# Patient Record
Sex: MALE | Race: WHITE | NOT HISPANIC OR LATINO | URBAN - METROPOLITAN AREA
[De-identification: names, ages, dates, MRNs, and addresses within clinical notes are randomized per-mention and may not be internally consistent; named-entity substitution may affect disease eponyms.]

---

## 2020-03-25 NOTE — PATIENT DISCUSSION
CORNEAL ABRASION; OS; INSERTION OF BANDAGE CONTACT LENS, IN OFFICE FOR PATIENT'S DISCOMFORT. CONTINUE TO MONITOR. RETURN FOR FOLLOW-UP AS SCHEDULED.

## 2020-03-25 NOTE — PATIENT DISCUSSION
New Prescription: erythromycin (erythromycin): ointment: 5 mg/gram (0.5 %) a small amount at bedtime as directed into affected eye 03-

## 2020-03-26 NOTE — PATIENT DISCUSSION
CORNEAL ABRASION; OS, RESOLVING; PRESCRIBE BESI/OCUFLOX QID OS AND EMYCIN JOSE QHS. CONTINUE TO MONITOR. RETURN FOR FOLLOW-UP AS SCHEDULED.

## 2020-04-01 NOTE — PATIENT DISCUSSION
Continue: erythromycin (erythromycin): ointment: 5 mg/gram (0.5 %) a small amount at bedtime as directed into affected eye 03-

## 2020-06-03 NOTE — PATIENT DISCUSSION
CATARACTS, OU - VISUALLY SIGNIFICANT. SCHEDULE _OD_ FIRST THEN LATER IN _OS_ DISCUSSED OPTION OF ____STANDARD OU__. PATIENT UNDERSTANDS AND DESIRES ____STANDARD OU__.

## 2020-06-03 NOTE — PATIENT DISCUSSION
Surgery Counseling: I have discussed the option of scheduling surgery versus following, as well as the risks, benefits and alternatives of cataract surgery with the patient. It was explained that the surgery is medically indicated at this time, and it can be performed at the patient's option as delaying will cause no further deterioration, therefore there is no rush and there is no harm in waiting to have surgery. It was also explained that there is no guarantee that removing the cataract will improve their vision. The patient understands and desires to proceed with cataract surgery with the implantation of an intraocular lens to improve vision for __tv / reading__. I have given the patient the prescribed regimen of the all-in-one drop to use before and after cataract surgery. They have elected to use the all-in-one option of Pred/Gati/Brom(prednisolone acetate,gatifloxacin,and bromfenac. Patient to administer as directed.

## 2020-07-29 NOTE — PATIENT DISCUSSION
Pre-Op 2nd Eye Counseling: The patient has noticed an improvement in their visual symptoms in the operative eye. The patient complains of decreased vision in the fellow eye when _______TV AND READING__. It was explained to the patient that the decision to proceed with cataract surgery in the fellow eye is entirely a separate decision from the surgical eye. All of the same risks, benefits and alternatives are reviewed with the patient again. The patient does feel the vision in the non-operative eye is limiting their daily activities and elects to proceed with cataract surgery in the _LEFT__ eye. . I have given the patient the prescribed regimen of  drops to use before and after cataract surgery. Patient to administer as directed.

## 2020-07-29 NOTE — PATIENT DISCUSSION
S/P PE IOL, _od__. DOING WELL. CONTINUE PRED-GATI-BROM IN THE SURGICAL EYE  FOR A TOTAL OF 3 WEEKS USE THEN DISCONTINUE. PATIENT DESIRES STANDARD_______IOL FOR 2ND EYE. SCHEDULE CATARACT SURGERY.

## 2020-08-17 NOTE — PATIENT DISCUSSION
Deysi Orozco is requesting refill(s) celexa  Last OV 2-26-19 (pertaining to medication)  LR 12-14-18 #30 5 RF (per medication requested)  Next office visit scheduled or attempted Yes 5-28-19   If no, reason: Vertex Distance:

## 2020-10-07 NOTE — PATIENT DISCUSSION
Continue: erythromycin (erythromycin): ointment: 5 mg/gram (0.5 %) a small amount at bedtime as directed into affected eye 03- Physical Therapy  Daily Treatment Note  Date: 10/7/2020  Patient Name: Jayme Diaz  MRN: [id number]     :   1943    Subjective:   General  Referring Practitioner: General Jitendra MD   PT Visit Information  Onset Date: 19  PT Insurance Information: [employer]  Total # of Visits Approved: 12  Total # of Visits to Date: 10  Plan of Care/Certification Expiration Date: 20  No Show: 1  Progress Note Due Date: 10/23/20  Canceled Appointment: 0  Progress Note Counter: 10/12  Subjective  Subjective: \"I feel real unsteady today for some reason. \"  The patient noted he felt like he had to lean harder on his walker to get around. Pain Screening  Patient Currently in Pain: Yes  Pain Assessment  Pain Assessment: 0-10  Pain Level: 3  Patient's Stated Pain Goal: No pain  Pain Type: Acute pain  Pain Location: Knee  Pain Orientation: Left  Pain Descriptors: Aching  Pain Frequency: Intermittent  Clinical Progression: Gradually improving  Functional Pain Assessment: Activities are not prevented  Non-Pharmaceutical Pain Intervention(s): Ambulation/Increased Activity;Cold applied  Response to Pain Intervention: None  Multiple Pain Sites: Yes  Vital Signs  Patient Currently in Pain: Yes  Patient Observation  Observations: poor gait and loss of balance       Treatment Activities:      Bed mobility  Bridging: Independent  Transfers  Sit to Stand: Stand by assistance        Ambulation  Ambulation?: Yes  WB Status: WBAT Left   Ambulation 1  Surface: level tile  Device: Rolling Walker  Assistance: Independent  Quality of Gait: fair  Gait Deviations: Slow Birdie; Increased JOSE; Decreased step length;Deviated path;Decreased arm swing  Distance: 150 ft   Comments: poor control of   Stairs/Curb  Stairs?: Yes  Stairs  # Steps : 3  Stairs Height: 8\"  Rails: Bilateral  Curbs: 2\"  Device: Rolling walker  Assistance: Supervision  Comment: Fall risk      Balance  Standing - Static: Fair  Standing - Dynamic: Poor  Comments: FOLLOW UP: Yes  Treatment Initiated : Neuro re-ed exercises. Discharge Recommendations: Continue to assess pending progress  Activity Tolerance  Activity Tolerance: Patient Tolerated treatment well        Goals:  Short term goals  Time Frame for Short term goals: 3 weeks   Short term goal 1: OPTIMAL score 15/3 initial score goal score 12/3. Short term goal 2: Independent with HEP. Long term goals  Time Frame for Long term goals : 6 weeks  Long term goal 1: OPTIMAL score of 6/3 at discharge. Long term goal 2: Cahnge roblero fall assessment by 10 points. Long term goal 3: Improve TUG score from 23 sec to 18 sec. Patient Goals   Patient goals : Walk normal  no falls.     Plan:     Continue   Timed Code Treatment Minutes: 45 Minutes  Total Treatment Time: 45  Frequency and duration of TX  Days: 2  Weeks: 6     Therapy Time   Individual Concurrent Group Co-treatment   Time In  1030         Time Out  1115         Minutes  45 min          Timed Code Treatment Minutes: 45 Minutes      Treatment Charges: Minutes Units   []  Ultrasound     []  Electrical-Stim     []  Iontophoresis     []  Traction     []  Massage       []  Eval     []  Gait     [x]  Ther Exercise 45  3   []  Manual Therapy       []  Ther Activities       []  Aquatics     []  Vasopneumatic Device     []  Neuro Re-Ed       []  Other       Total Treatment Time: 39 3        Alcides Bowie, PT

## 2021-01-29 ENCOUNTER — IMPORTED ENCOUNTER (OUTPATIENT)
Dept: URBAN - METROPOLITAN AREA CLINIC 50 | Facility: CLINIC | Age: 61
End: 2021-01-29

## 2021-03-19 ENCOUNTER — IMPORTED ENCOUNTER (OUTPATIENT)
Dept: URBAN - METROPOLITAN AREA CLINIC 50 | Facility: CLINIC | Age: 61
End: 2021-03-19

## 2021-04-17 ASSESSMENT — VISUAL ACUITY
OD_BAT: 20/200
OS_BAT: 20/20
OD_CC: J1+
OD_OTHER: 20/200. >20/400.
OD_CC: 20/70-
OS_CC: 20/25+
OS_CC: J1+
OS_OTHER: 20/20. 20/30.

## 2021-04-17 ASSESSMENT — TONOMETRY
OS_IOP_MMHG: 17
OD_IOP_MMHG: 17

## 2021-07-21 NOTE — PATIENT DISCUSSION
New Prescription: Lotemax (loteprednol etabonate): gel: 0.5% 1 drop three times a day into both eyes 07-

## 2021-08-30 ENCOUNTER — PREPPED CHART (OUTPATIENT)
Dept: URBAN - METROPOLITAN AREA CLINIC 48 | Facility: CLINIC | Age: 61
End: 2021-08-30

## 2021-09-14 ENCOUNTER — CATARACT EVALUATION (OUTPATIENT)
Dept: URBAN - METROPOLITAN AREA CLINIC 52 | Facility: CLINIC | Age: 61
End: 2021-09-14

## 2021-09-14 DIAGNOSIS — H52.4: ICD-10-CM

## 2021-09-14 DIAGNOSIS — H35.351: ICD-10-CM

## 2021-09-14 DIAGNOSIS — H25.812: ICD-10-CM

## 2021-09-14 DIAGNOSIS — H25.811: ICD-10-CM

## 2021-09-14 DIAGNOSIS — H52.13: ICD-10-CM

## 2021-09-14 DIAGNOSIS — H35.372: ICD-10-CM

## 2021-09-14 DIAGNOSIS — H52.202: ICD-10-CM

## 2021-09-14 DIAGNOSIS — H43.811: ICD-10-CM

## 2021-09-14 DIAGNOSIS — D31.32: ICD-10-CM

## 2021-09-14 PROCEDURE — 92014 COMPRE OPH EXAM EST PT 1/>: CPT

## 2021-09-14 PROCEDURE — 92015 DETERMINE REFRACTIVE STATE: CPT

## 2021-09-14 PROCEDURE — 92134 CPTRZ OPH DX IMG PST SGM RTA: CPT

## 2021-09-14 ASSESSMENT — VISUAL ACUITY
OS_GLARE: 20/50
OD_CC: 20/80
OD_PH: 20/40-1
OD_GLARE: 20/400
OD_GLARE: 20/60
OU_CC: J1
OS_GLARE: 20/400
OS_CC: 20/20-1

## 2021-09-14 ASSESSMENT — TONOMETRY
OS_IOP_MMHG: 14
OD_IOP_MMHG: 16

## 2021-09-15 ENCOUNTER — BIOMETRY (OUTPATIENT)
Dept: URBAN - METROPOLITAN AREA CLINIC 52 | Facility: CLINIC | Age: 61
End: 2021-09-15

## 2021-09-15 DIAGNOSIS — H25.13: ICD-10-CM

## 2021-09-15 DIAGNOSIS — H25.811: ICD-10-CM

## 2021-09-15 DIAGNOSIS — H25.812: ICD-10-CM

## 2021-09-15 PROCEDURE — 92136 OPHTHALMIC BIOMETRY: CPT

## 2021-09-15 PROCEDURE — TOPOIOL CORNEAL TOPOGRAPHY-PREMIUM IOL

## 2021-09-15 ASSESSMENT — KERATOMETRY
OD_AXISANGLE2_DEGREES: 024
OD_K1POWER_DIOPTERS: 42.50
OS_K2POWER_DIOPTERS: 41.50
OS_AXISANGLE2_DEGREES: 098
OD_K2POWER_DIOPTERS: 42.12
OS_K1POWER_DIOPTERS: 42.25
OS_AXISANGLE_DEGREES: 8
OD_AXISANGLE_DEGREES: 114

## 2021-09-21 ENCOUNTER — PRE OP - CE/IOL OD (OUTPATIENT)
Dept: URBAN - METROPOLITAN AREA CLINIC 52 | Facility: CLINIC | Age: 61
End: 2021-09-21

## 2021-09-21 DIAGNOSIS — H25.811: ICD-10-CM

## 2021-09-21 PROCEDURE — PREOP PRE OP VISIT

## 2021-09-21 ASSESSMENT — KERATOMETRY
OS_K2POWER_DIOPTERS: 41.50
OD_K1POWER_DIOPTERS: 42.50
OS_AXISANGLE_DEGREES: 8
OS_K1POWER_DIOPTERS: 42.25
OD_K2POWER_DIOPTERS: 42.12
OD_AXISANGLE2_DEGREES: 024
OD_AXISANGLE_DEGREES: 114
OS_AXISANGLE2_DEGREES: 098

## 2021-09-21 ASSESSMENT — TONOMETRY
OD_IOP_MMHG: 16
OS_IOP_MMHG: 15

## 2021-09-21 ASSESSMENT — VISUAL ACUITY
OD_SC: CF 5FT
OS_SC: 20/40-2

## 2021-09-30 ENCOUNTER — SURGERY/PROCEDURE (OUTPATIENT)
Dept: URBAN - METROPOLITAN AREA SURGERY 16 | Facility: SURGERY | Age: 61
End: 2021-09-30

## 2021-09-30 ENCOUNTER — SAME DAY PO - CE/IOL (OUTPATIENT)
Dept: URBAN - METROPOLITAN AREA CLINIC 48 | Facility: CLINIC | Age: 61
End: 2021-09-30

## 2021-09-30 DIAGNOSIS — Z98.41: ICD-10-CM

## 2021-09-30 DIAGNOSIS — H25.811: ICD-10-CM

## 2021-09-30 PROCEDURE — DIB00FEMTO EYHANCE MONOFOCAL IOL WITH FEMTO

## 2021-09-30 PROCEDURE — 99024 POSTOP FOLLOW-UP VISIT: CPT

## 2021-09-30 PROCEDURE — 66984 XCAPSL CTRC RMVL W/O ECP: CPT

## 2021-09-30 ASSESSMENT — KERATOMETRY
OD_AXISANGLE_DEGREES: 114
OD_K1POWER_DIOPTERS: 42.50
OS_K1POWER_DIOPTERS: 42.25
OD_AXISANGLE_DEGREES: 114
OS_AXISANGLE2_DEGREES: 098
OS_K2POWER_DIOPTERS: 41.50
OS_AXISANGLE_DEGREES: 8
OS_K1POWER_DIOPTERS: 42.25
OS_AXISANGLE2_DEGREES: 098
OD_AXISANGLE2_DEGREES: 024
OD_K2POWER_DIOPTERS: 42.12
OS_AXISANGLE_DEGREES: 8
OD_K2POWER_DIOPTERS: 42.12
OD_AXISANGLE2_DEGREES: 024
OD_K1POWER_DIOPTERS: 42.50
OS_K2POWER_DIOPTERS: 41.50

## 2021-09-30 ASSESSMENT — TONOMETRY: OD_IOP_MMHG: 24

## 2021-09-30 ASSESSMENT — VISUAL ACUITY: OD_SC: 20/200

## 2021-10-05 ENCOUNTER — 1 WEEK PO - CE/IOL (OUTPATIENT)
Dept: URBAN - METROPOLITAN AREA CLINIC 52 | Facility: CLINIC | Age: 61
End: 2021-10-05

## 2021-10-05 DIAGNOSIS — H35.351: ICD-10-CM

## 2021-10-05 DIAGNOSIS — H35.372: ICD-10-CM

## 2021-10-05 DIAGNOSIS — Z98.41: ICD-10-CM

## 2021-10-05 PROCEDURE — 92134 CPTRZ OPH DX IMG PST SGM RTA: CPT

## 2021-10-05 PROCEDURE — 99024 POSTOP FOLLOW-UP VISIT: CPT

## 2021-10-05 ASSESSMENT — TONOMETRY
OD_IOP_MMHG: 19
OS_IOP_MMHG: 14

## 2021-10-05 ASSESSMENT — VISUAL ACUITY
OD_SC: 20/50-1
OS_SC: 20/30-1

## 2021-10-12 ENCOUNTER — PROBLEM (OUTPATIENT)
Dept: URBAN - METROPOLITAN AREA CLINIC 49 | Facility: CLINIC | Age: 61
End: 2021-10-12

## 2021-10-12 DIAGNOSIS — Z96.1: ICD-10-CM

## 2021-10-12 DIAGNOSIS — H35.351: ICD-10-CM

## 2021-10-12 DIAGNOSIS — H35.372: ICD-10-CM

## 2021-10-12 DIAGNOSIS — H43.811: ICD-10-CM

## 2021-10-12 PROCEDURE — 99024 POSTOP FOLLOW-UP VISIT: CPT

## 2021-10-12 PROCEDURE — 92134 CPTRZ OPH DX IMG PST SGM RTA: CPT

## 2021-10-12 ASSESSMENT — VISUAL ACUITY
OS_SC: 20/30
OD_SC: 20/40-2

## 2021-10-12 ASSESSMENT — TONOMETRY
OS_IOP_MMHG: 15
OD_IOP_MMHG: 17

## 2021-11-02 ENCOUNTER — 4 WEEK PO - CE/IOL (OUTPATIENT)
Dept: URBAN - METROPOLITAN AREA CLINIC 52 | Facility: CLINIC | Age: 61
End: 2021-11-02

## 2021-11-02 DIAGNOSIS — H35.371: ICD-10-CM

## 2021-11-02 DIAGNOSIS — Z96.1: ICD-10-CM

## 2021-11-02 DIAGNOSIS — H35.351: ICD-10-CM

## 2021-11-02 DIAGNOSIS — Z98.41: ICD-10-CM

## 2021-11-02 PROCEDURE — 99024 POSTOP FOLLOW-UP VISIT: CPT

## 2021-11-02 PROCEDURE — 92134 CPTRZ OPH DX IMG PST SGM RTA: CPT

## 2021-11-02 RX ORDER — DICLOFENAC SODIUM 1 MG/ML: 1 SOLUTION OPHTHALMIC TWICE A DAY

## 2021-11-02 RX ORDER — DUREZOL 0.5 MG/ML: 1 EMULSION OPHTHALMIC TWICE A DAY

## 2021-11-02 ASSESSMENT — VISUAL ACUITY
OD_GLARE: >20/400
OS_CC: 20/20-2
OS_GLARE: 20/25
OS_GLARE: 20/20
OD_SC: 20/40-2

## 2021-11-02 ASSESSMENT — TONOMETRY
OD_IOP_MMHG: 15
OS_IOP_MMHG: 14

## 2022-01-13 ENCOUNTER — CONTACT LENSES/GLASSES VISIT (OUTPATIENT)
Dept: URBAN - METROPOLITAN AREA CLINIC 48 | Facility: CLINIC | Age: 62
End: 2022-01-13

## 2022-01-13 DIAGNOSIS — H52.4: ICD-10-CM

## 2022-01-13 PROCEDURE — 92015GRNC REFRACTION GLASSES RECHECK NO CHARGE

## 2022-01-13 ASSESSMENT — VISUAL ACUITY
OS_CC: 20/20
OS_SC: J1+
OU_SC: J1+
OD_SC: J16

## 2022-06-22 ENCOUNTER — ESTABLISHED PATIENT (OUTPATIENT)
Dept: URBAN - METROPOLITAN AREA CLINIC 53 | Facility: CLINIC | Age: 62
End: 2022-06-22

## 2022-06-22 DIAGNOSIS — H35.371: ICD-10-CM

## 2022-06-22 DIAGNOSIS — D31.32: ICD-10-CM

## 2022-06-22 DIAGNOSIS — H35.351: ICD-10-CM

## 2022-06-22 DIAGNOSIS — H16.223: ICD-10-CM

## 2022-06-22 DIAGNOSIS — H25.812: ICD-10-CM

## 2022-06-22 PROCEDURE — 92134 CPTRZ OPH DX IMG PST SGM RTA: CPT

## 2022-06-22 PROCEDURE — 92014 COMPRE OPH EXAM EST PT 1/>: CPT

## 2022-06-22 ASSESSMENT — VISUAL ACUITY
OU_SC: J1+
OD_PH: 20/30
OS_CC: 20/20

## 2022-06-22 ASSESSMENT — TONOMETRY
OS_IOP_MMHG: 17
OD_IOP_MMHG: 17

## 2022-10-18 ENCOUNTER — ESTABLISHED PATIENT (OUTPATIENT)
Dept: URBAN - METROPOLITAN AREA CLINIC 48 | Facility: LOCATION | Age: 62
End: 2022-10-18

## 2022-10-18 DIAGNOSIS — D31.32: ICD-10-CM

## 2022-10-18 DIAGNOSIS — H35.351: ICD-10-CM

## 2022-10-18 DIAGNOSIS — H53.2: ICD-10-CM

## 2022-10-18 DIAGNOSIS — H35.371: ICD-10-CM

## 2022-10-18 DIAGNOSIS — H16.223: ICD-10-CM

## 2022-10-18 PROCEDURE — 92134 CPTRZ OPH DX IMG PST SGM RTA: CPT

## 2022-10-18 PROCEDURE — 92014 COMPRE OPH EXAM EST PT 1/>: CPT

## 2022-10-18 ASSESSMENT — TONOMETRY
OD_IOP_MMHG: 16
OS_IOP_MMHG: 15

## 2022-10-18 ASSESSMENT — VISUAL ACUITY: OS_CC: 20/20-1

## 2022-11-18 ENCOUNTER — CONTACT LENSES/GLASSES VISIT (OUTPATIENT)
Dept: URBAN - METROPOLITAN AREA CLINIC 48 | Facility: LOCATION | Age: 62
End: 2022-11-18

## 2022-11-18 DIAGNOSIS — H53.2: ICD-10-CM

## 2022-11-18 PROCEDURE — 92060 SENSORIMOTOR EXAMINATION: CPT

## 2022-11-18 ASSESSMENT — VISUAL ACUITY
OD_CC: 20/30+2
OU_SC: J1+/-2
OU_CC: 20/20
OS_CC: 20/20

## 2022-12-14 ENCOUNTER — FOLLOW UP (OUTPATIENT)
Dept: URBAN - METROPOLITAN AREA CLINIC 53 | Facility: CLINIC | Age: 62
End: 2022-12-14

## 2022-12-14 PROCEDURE — 92014 COMPRE OPH EXAM EST PT 1/>: CPT

## 2022-12-14 ASSESSMENT — VISUAL ACUITY
OU_CC: 20/20
OD_GLARE: 20/50
OD_CC: 20/40-2
OS_CC: 20/20-1
OD_PH: 20/30-2
OD_GLARE: 20/50
OU_SC: J1+ @ 18 IN
OS_GLARE: 20/20
OS_GLARE: 20/20

## 2022-12-14 ASSESSMENT — TONOMETRY
OD_IOP_MMHG: 21
OS_IOP_MMHG: 12

## 2024-04-03 ENCOUNTER — CONSULTATION/EVALUATION (OUTPATIENT)
Dept: URBAN - METROPOLITAN AREA CLINIC 53 | Facility: CLINIC | Age: 64
End: 2024-04-03

## 2024-04-03 DIAGNOSIS — H25.812: ICD-10-CM

## 2024-04-03 DIAGNOSIS — D31.32: ICD-10-CM

## 2024-04-03 DIAGNOSIS — H35.371: ICD-10-CM

## 2024-04-03 DIAGNOSIS — Z96.1: ICD-10-CM

## 2024-04-03 DIAGNOSIS — H53.2: ICD-10-CM

## 2024-04-03 PROCEDURE — 92015 DETERMINE REFRACTIVE STATE: CPT

## 2024-04-03 PROCEDURE — 99214 OFFICE O/P EST MOD 30 MIN: CPT

## 2024-04-03 PROCEDURE — 92134 CPTRZ OPH DX IMG PST SGM RTA: CPT

## 2024-04-03 ASSESSMENT — VISUAL ACUITY
OS_GLARE: 20/20
OD_SC: 20/30
OU_CC: J1+ @ 16"
OS_GLARE: 20/30
OD_GLARE: 20/50
OD_GLARE: 20/50
OD_PH: 20/25
OS_CC: 20/20-2

## 2024-04-03 ASSESSMENT — TONOMETRY
OD_IOP_MMHG: 17
OS_IOP_MMHG: 15

## 2024-08-13 NOTE — PATIENT DISCUSSION
Continue: erythromycin (erythromycin): ointment: 5 mg/gram (0.5 %) a small amount at bedtime as directed into affected eye 03- Medical Necessity Clause: This procedure was medically necessary because the lesions that were treated were: Medical Necessity Information: It is in your best interest to select a reason for this procedure from the list below. All of these items fulfill various CMS LCD requirements except the new and changing color options. Spray Paint Technique: No Show Applicator Variable?: Yes Post-Care Instructions: I reviewed with the patient in detail post-care instructions. Patient is to avoid picking at any of the treated lesions. Pt may apply Vaseline, Aquaphor, or CeraVe Healing Ointment to crusted or scabbing areas. Detail Level: Detailed Consent: The patient's consent was obtained including but not limited to risks of crusting, scabbing, blistering, scarring, darker or lighter pigmentary change, recurrence, incomplete removal and infection. Spray Paint Text: The liquid nitrogen was applied to the skin utilizing a spray paint frosting technique. Number Of Freeze-Thaw Cycles: 2 freeze-thaw cycles

## 2025-04-10 ENCOUNTER — COMPREHENSIVE EXAM (OUTPATIENT)
Age: 65
End: 2025-04-10

## 2025-04-10 DIAGNOSIS — H52.4: ICD-10-CM

## 2025-04-10 DIAGNOSIS — D31.32: ICD-10-CM

## 2025-04-10 DIAGNOSIS — H25.812: ICD-10-CM

## 2025-04-10 DIAGNOSIS — H35.351: ICD-10-CM

## 2025-04-10 DIAGNOSIS — H35.371: ICD-10-CM

## 2025-04-10 DIAGNOSIS — Z96.1: ICD-10-CM

## 2025-04-10 PROCEDURE — 92134 CPTRZ OPH DX IMG PST SGM RTA: CPT

## 2025-04-10 PROCEDURE — 92015 DETERMINE REFRACTIVE STATE: CPT

## 2025-04-10 PROCEDURE — 99214 OFFICE O/P EST MOD 30 MIN: CPT
